# Patient Record
Sex: MALE | URBAN - METROPOLITAN AREA
[De-identification: names, ages, dates, MRNs, and addresses within clinical notes are randomized per-mention and may not be internally consistent; named-entity substitution may affect disease eponyms.]

---

## 2022-10-18 ENCOUNTER — WEB ENCOUNTER (OUTPATIENT)
Dept: URBAN - METROPOLITAN AREA CLINIC 23 | Facility: CLINIC | Age: 57
End: 2022-10-18

## 2022-10-20 ENCOUNTER — WEB ENCOUNTER (OUTPATIENT)
Dept: URBAN - METROPOLITAN AREA CLINIC 23 | Facility: CLINIC | Age: 57
End: 2022-10-20

## 2022-10-24 ENCOUNTER — LAB OUTSIDE AN ENCOUNTER (OUTPATIENT)
Dept: URBAN - METROPOLITAN AREA CLINIC 23 | Facility: CLINIC | Age: 57
End: 2022-10-24

## 2022-10-24 ENCOUNTER — OFFICE VISIT (OUTPATIENT)
Dept: URBAN - METROPOLITAN AREA CLINIC 23 | Facility: CLINIC | Age: 57
End: 2022-10-24
Payer: COMMERCIAL

## 2022-10-24 VITALS
SYSTOLIC BLOOD PRESSURE: 130 MMHG | BODY MASS INDEX: 24.91 KG/M2 | HEIGHT: 66 IN | WEIGHT: 155 LBS | DIASTOLIC BLOOD PRESSURE: 86 MMHG | HEART RATE: 84 BPM | TEMPERATURE: 97 F

## 2022-10-24 DIAGNOSIS — R19.5 LOOSE STOOLS: ICD-10-CM

## 2022-10-24 DIAGNOSIS — Z86.010 HISTORY OF COLONIC POLYPS: ICD-10-CM

## 2022-10-24 DIAGNOSIS — K30 INDIGESTION: ICD-10-CM

## 2022-10-24 DIAGNOSIS — R68.81 EARLY SATIETY: ICD-10-CM

## 2022-10-24 PROBLEM — 428283002: Status: ACTIVE | Noted: 2022-10-24

## 2022-10-24 PROCEDURE — 1036F TOBACCO NON-USER: CPT | Performed by: INTERNAL MEDICINE

## 2022-10-24 PROCEDURE — G8420 CALC BMI NORM PARAMETERS: HCPCS | Performed by: INTERNAL MEDICINE

## 2022-10-24 PROCEDURE — G8427 DOCREV CUR MEDS BY ELIG CLIN: HCPCS | Performed by: INTERNAL MEDICINE

## 2022-10-24 PROCEDURE — G9622 NO UNHEAL ETOH USER: HCPCS | Performed by: INTERNAL MEDICINE

## 2022-10-24 PROCEDURE — 3017F COLORECTAL CA SCREEN DOC REV: CPT | Performed by: INTERNAL MEDICINE

## 2022-10-24 PROCEDURE — 99204 OFFICE O/P NEW MOD 45 MIN: CPT | Performed by: INTERNAL MEDICINE

## 2022-10-24 RX ORDER — CHOLECALCIFEROL (VITAMIN D3) 50 MCG
1 TABLET TABLET ORAL ONCE A DAY
Status: ACTIVE | COMMUNITY

## 2022-10-24 RX ORDER — LISINOPRIL 20 MG/1
1 TABLET TABLET ORAL ONCE A DAY
Status: ACTIVE | COMMUNITY

## 2022-10-24 RX ORDER — ROSUVASTATIN CALCIUM 40 MG/1
1 TABLET TABLET, FILM COATED ORAL ONCE A DAY
Status: ACTIVE | COMMUNITY

## 2022-10-24 NOTE — PREVIOUS WORKUP REVIEWED
.ENDOSCOPIES-Colonoscopy 3/4/2019: Long colon.  Excellent prep.  Diminutive polyps in the rectum.  Otherwise normal colonoscopy.-EGD 12/15/2018: Erosive esophagitis.  Decreased LES.  Erythema in the antrum.  Otherwise normal EGD.*Pathology 12/15/2018: Gastric antrum-mild chronic inactive gastritis, negative for H. pylori and intestinal metaplasia.-EGD 12/23/2015: Irregular Z-line, decreased LES.  Diffuse erythema in the antrum.  Otherwise normal EGD.*Pathology 12/23/2015: Gastric antrum-minimal chronic inflammation, negative for intestinal metaplasia and H. pylori.-Colonoscopy 12/16/2015: Diverticulosis in the cecum.  1 cm polyp in the sigmoid colon.  Small polyps in the rectum.*Pathology 12/16/2015: Sigmoid colon polyp-tubular adenoma.  Rectal polyp-hyperplastic polyp.*Pathology 7/20/2012: Ileocecal valve polyp-tubular adenoma.LABSIMAGES

## 2022-10-24 NOTE — HPI-TODAY'S VISIT:
57-year-old [place] male presents for chronic indigestion and early satiety.  He was recommended by PCP to get checked with GI work-up, EGD. He already had H. pylori breath test, came back normal.  He also had a Cologuard test result is pending. He moves bowel daily, 2-3 bowel moods per day.  Postprandial, loose stools.  Is a scheduled for kidney stone removal surgery November 8.  He wants to get endoscopy exam before that date.  Previous EGD in 2018, esophagitis was noted.  Currently not on any medication. Colonoscopy in 2019.  Polyps. History of significant alcohol use (binge drinking, currently drinks 1 drink of hard liquor every day). Denies a history of pancreatitis.

## 2022-10-25 PROBLEM — 162031009: Status: ACTIVE | Noted: 2022-10-24

## 2022-11-12 ENCOUNTER — WEB ENCOUNTER (OUTPATIENT)
Dept: URBAN - METROPOLITAN AREA CLINIC 23 | Facility: CLINIC | Age: 57
End: 2022-11-12

## 2022-12-16 ENCOUNTER — OFFICE VISIT (OUTPATIENT)
Dept: URBAN - METROPOLITAN AREA MEDICAL CENTER 27 | Facility: MEDICAL CENTER | Age: 57
End: 2022-12-16
Payer: COMMERCIAL

## 2022-12-16 DIAGNOSIS — K21.00 GASTROESOPHAGEAL REFLUX DISEASE WITH ESOPHAGITIS, UNSPECIFIED WHETHER HEMORRHAGE: ICD-10-CM

## 2022-12-16 DIAGNOSIS — K22.89 DILATATION OF ESOPHAGUS: ICD-10-CM

## 2022-12-16 DIAGNOSIS — K29.60 ADENOPAPILLOMATOSIS GASTRICA: ICD-10-CM

## 2022-12-16 PROBLEM — 266433003: Status: ACTIVE | Noted: 2022-12-16

## 2022-12-16 PROCEDURE — 43239 EGD BIOPSY SINGLE/MULTIPLE: CPT | Performed by: INTERNAL MEDICINE

## 2022-12-16 RX ORDER — LISINOPRIL 20 MG/1
1 TABLET TABLET ORAL ONCE A DAY
Status: ACTIVE | COMMUNITY

## 2022-12-16 RX ORDER — ROSUVASTATIN CALCIUM 40 MG/1
1 TABLET TABLET, FILM COATED ORAL ONCE A DAY
Status: ACTIVE | COMMUNITY

## 2022-12-16 RX ORDER — ESOMEPRAZOLE MAGNESIUM 40 MG/1
1 CAPSULE CAPSULE, DELAYED RELEASE ORAL ONCE A DAY
Qty: 90 | Refills: 3 | OUTPATIENT
Start: 2022-12-16

## 2022-12-16 RX ORDER — CHOLECALCIFEROL (VITAMIN D3) 50 MCG
1 TABLET TABLET ORAL ONCE A DAY
Status: ACTIVE | COMMUNITY

## 2023-09-07 ENCOUNTER — WEB ENCOUNTER (OUTPATIENT)
Dept: URBAN - METROPOLITAN AREA CLINIC 23 | Facility: CLINIC | Age: 58
End: 2023-09-07

## 2023-09-08 ENCOUNTER — TELEPHONE ENCOUNTER (OUTPATIENT)
Dept: URBAN - METROPOLITAN AREA CLINIC 23 | Facility: CLINIC | Age: 58
End: 2023-09-08

## 2023-09-08 ENCOUNTER — CLAIMS CREATED FROM THE CLAIM WINDOW (OUTPATIENT)
Dept: URBAN - METROPOLITAN AREA CLINIC 23 | Facility: CLINIC | Age: 58
End: 2023-09-08

## 2023-09-08 ENCOUNTER — LAB OUTSIDE AN ENCOUNTER (OUTPATIENT)
Dept: URBAN - METROPOLITAN AREA CLINIC 23 | Facility: CLINIC | Age: 58
End: 2023-09-08

## 2023-09-08 ENCOUNTER — OFFICE VISIT (OUTPATIENT)
Dept: URBAN - METROPOLITAN AREA CLINIC 23 | Facility: CLINIC | Age: 58
End: 2023-09-08

## 2023-09-08 VITALS
HEART RATE: 92 BPM | BODY MASS INDEX: 23.95 KG/M2 | TEMPERATURE: 97.9 F | HEIGHT: 66 IN | WEIGHT: 149 LBS | DIASTOLIC BLOOD PRESSURE: 72 MMHG | SYSTOLIC BLOOD PRESSURE: 108 MMHG

## 2023-09-08 DIAGNOSIS — K21.9 CHRONIC GERD: ICD-10-CM

## 2023-09-08 DIAGNOSIS — Z86.010 HISTORY OF ADENOMATOUS POLYP OF COLON: ICD-10-CM

## 2023-09-08 PROBLEM — 429047008: Status: ACTIVE | Noted: 2023-09-08

## 2023-09-08 PROBLEM — 235595009: Status: ACTIVE | Noted: 2023-09-08

## 2023-09-08 RX ORDER — LISINOPRIL 20 MG/1
1 TABLET TABLET ORAL ONCE A DAY
Status: ACTIVE | COMMUNITY

## 2023-09-08 RX ORDER — ROSUVASTATIN CALCIUM 40 MG/1
1 TABLET TABLET, FILM COATED ORAL ONCE A DAY
Status: ACTIVE | COMMUNITY

## 2023-09-08 RX ORDER — SODIUM, POTASSIUM,MAG SULFATES 17.5-3.13G
AS DIRECTED SOLUTION, RECONSTITUTED, ORAL ORAL AS DIRECTED
Qty: 354 MILLILITER | Refills: 0 | OUTPATIENT
Start: 2023-09-08

## 2023-09-08 RX ORDER — PANTOPRAZOLE 40 MG/1
1 TABLET TABLET, DELAYED RELEASE ORAL ONCE A DAY
Qty: 90 TABLET | Refills: 1 | OUTPATIENT
Start: 2023-09-08

## 2023-09-08 NOTE — PREVIOUS WORKUP REVIEWED
REVIEWED EXTERNAL MEDICAL RECORD  ENDOSCOPIES -EGD 12/16/2022:Glycogenic enthesis of the esophagus.  LA grade C reflux esophagitis.  Gastritis.  Normal duodenum.*Pathology:Gastric antrum-chronic inactive gastritis, negative for H. pylori and intestinal metaplasia.  Gastric body-chronic gastritis, mild.  Negative for H. pylori and intestinal metaplasia.-Colonoscopy 3/4/2019: Long colon.  Excellent prep.  Diminutive polyps in the rectum.  Otherwise normal colonoscopy.-EGD 12/15/2018: Erosive esophagitis.  Decreased LES.  Erythema in the antrum.  Otherwise normal EGD.*Pathology 12/15/2018: Gastric antrum-mild chronic inactive gastritis, negative for H. pylori and intestinal metaplasia.-EGD 12/23/2015: Irregular Z-line, decreased LES.  Diffuse erythema in the antrum.  Otherwise normal EGD.*Pathology 12/23/2015: Gastric antrum-minimal chronic inflammation, negative for intestinal metaplasia and H. pylori.-Colonoscopy 12/16/2015: Diverticulosis in the cecum.  1 cm polyp in the sigmoid colon.  Small polyps in the rectum.*Pathology 12/16/2015: Sigmoid colon polyp-tubular adenoma.  Rectal polyp-hyperplastic polyp.*Pathology 7/20/2012: Ileocecal valve polyp-tubular adenoma.  LABS IMAGES

## 2023-09-08 NOTE — HPI-TODAY'S VISIT:
57-year-old male presents for history of adenomatous colon polyps and chronic GERD.  His last colonoscopy 4 years ago. No family history of colon cancer.  Denies blood in stool or change in bowel habit.  He moves bowel once daily in the morning. He reports heartburn and indigestion.  He stopped taking PPI.  He has a history of LA grade C esophagitis.

## 2023-09-13 ENCOUNTER — TELEPHONE ENCOUNTER (OUTPATIENT)
Dept: URBAN - METROPOLITAN AREA CLINIC 92 | Facility: CLINIC | Age: 58
End: 2023-09-13

## 2023-10-18 ENCOUNTER — WEB ENCOUNTER (OUTPATIENT)
Dept: URBAN - METROPOLITAN AREA CLINIC 23 | Facility: CLINIC | Age: 58
End: 2023-10-18

## 2023-10-20 ENCOUNTER — OFFICE VISIT (OUTPATIENT)
Dept: URBAN - METROPOLITAN AREA MEDICAL CENTER 27 | Facility: MEDICAL CENTER | Age: 58
End: 2023-10-20
Payer: COMMERCIAL

## 2023-10-20 DIAGNOSIS — Z09 CARDIOLOGY FOLLOW-UP ENCOUNTER: ICD-10-CM

## 2023-10-20 DIAGNOSIS — Z86.010 ADENOMAS PERSONAL HISTORY OF COLONIC POLYPS: ICD-10-CM

## 2023-10-20 PROCEDURE — G0105 COLORECTAL SCRN; HI RISK IND: HCPCS | Performed by: INTERNAL MEDICINE

## 2023-10-20 RX ORDER — SODIUM, POTASSIUM,MAG SULFATES 17.5-3.13G
AS DIRECTED SOLUTION, RECONSTITUTED, ORAL ORAL AS DIRECTED
Qty: 354 MILLILITER | Refills: 0 | Status: ACTIVE | COMMUNITY
Start: 2023-09-08

## 2023-10-20 RX ORDER — LISINOPRIL 20 MG/1
1 TABLET TABLET ORAL ONCE A DAY
Status: ACTIVE | COMMUNITY

## 2023-10-20 RX ORDER — ROSUVASTATIN CALCIUM 40 MG/1
1 TABLET TABLET, FILM COATED ORAL ONCE A DAY
Status: ACTIVE | COMMUNITY

## 2023-10-20 RX ORDER — PANTOPRAZOLE 40 MG/1
1 TABLET TABLET, DELAYED RELEASE ORAL ONCE A DAY
Qty: 90 TABLET | Refills: 1 | Status: ACTIVE | COMMUNITY
Start: 2023-09-08

## 2024-02-05 ENCOUNTER — OV EP (OUTPATIENT)
Dept: URBAN - METROPOLITAN AREA CLINIC 23 | Facility: CLINIC | Age: 59
End: 2024-02-05
Payer: COMMERCIAL

## 2024-02-05 VITALS
SYSTOLIC BLOOD PRESSURE: 111 MMHG | TEMPERATURE: 98.1 F | WEIGHT: 150.8 LBS | HEIGHT: 66 IN | BODY MASS INDEX: 24.23 KG/M2 | DIASTOLIC BLOOD PRESSURE: 73 MMHG | HEART RATE: 86 BPM

## 2024-02-05 DIAGNOSIS — R10.13 POSTPRANDIAL EPIGASTRIC PAIN: ICD-10-CM

## 2024-02-05 DIAGNOSIS — I77.1 CELIAC ARTERY STENOSIS: ICD-10-CM

## 2024-02-05 DIAGNOSIS — D13.5 ADENOMYOMA, GALLBLADDER: ICD-10-CM

## 2024-02-05 PROBLEM — 16846341000119101: Status: ACTIVE | Noted: 2024-02-05

## 2024-02-05 PROCEDURE — 99214 OFFICE O/P EST MOD 30 MIN: CPT | Performed by: INTERNAL MEDICINE

## 2024-02-05 RX ORDER — LISINOPRIL 20 MG/1
1 TABLET TABLET ORAL ONCE A DAY
Status: ACTIVE | COMMUNITY

## 2024-02-05 RX ORDER — PANTOPRAZOLE 40 MG/1
1 TABLET TABLET, DELAYED RELEASE ORAL ONCE A DAY
Qty: 90 TABLET | Refills: 1 | Status: ACTIVE | COMMUNITY
Start: 2023-09-08

## 2024-02-05 NOTE — PHYSICAL EXAM CONSTITUTIONAL:
- , - , - , well developed, well nourished , in no acute distress , ambulating without difficulty , normal communication ability

## 2024-02-05 NOTE — HPI-TODAY'S VISIT:
58-year-old male presents for abnormal MRI findings notated during nephrology workup.  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 He has frequent postprandial discomfort in the epigastric area. He saw a vascular surgeon, and was told to monitor and follow-up in 6 months.  He wants a second opinion. Denies family history of bladder cancer.

## 2024-02-05 NOTE — PREVIOUS WORKUP REVIEWED
REVIEWED EXTERNAL MEDICAL RECORD  ENDOSCOPIES -Colonoscopy 10/20/2023: Normal TI.  Normal colon.  Repeat colonoscopy in 5 years. -EGD 12/16/2022:Glycogenic enthesis of the esophagus.  LA grade C reflux esophagitis.  Gastritis.  Normal duodenum. *Pathology:Gastric antrum-chronic inactive gastritis, negative for H. pylori and intestinal metaplasia.  Gastric body-chronic gastritis, mild.  Negative for H. pylori and intestinal metaplasia.  Repeat EGD in 3 years. -Colonoscopy 3/4/2019: Long colon.  Excellent prep.  Diminutive polyps in the rectum.  Otherwise normal colonoscopy. -EGD 12/15/2018: Erosive esophagitis.  Decreased LES.  Erythema in the antrum.  Otherwise normal EGD. *Pathology 12/15/2018: Gastric antrum-mild chronic inactive gastritis, negative for H. pylori and intestinal metaplasia. -EGD 12/23/2015: Irregular Z-line, decreased LES.  Diffuse erythema in the antrum.  Otherwise normal EGD. *Pathology 12/23/2015: Gastric antrum-minimal chronic inflammation, negative for intestinal metaplasia and H. pylori. -Colonoscopy 12/16/2015: Diverticulosis in the cecum.  1 cm polyp in the sigmoid colon.  Small polyps in the rectum. *Pathology 12/16/2015: Sigmoid colon polyp-tubular adenoma.  Rectal polyp-hyperplastic polyp. *Pathology 7/20/2012: Ileocecal valve polyp-tubular adenoma.  LABS -Labs 1/15/2024: BUN 12, creatinine 0.93, total protein 7.0, albumin 4.5, total bilirubin 0.7, alkaline phosphatase 79, AST 20, ALT 21.  IMAGES

## 2024-11-07 ENCOUNTER — TELEPHONE ENCOUNTER (OUTPATIENT)
Dept: URBAN - METROPOLITAN AREA CLINIC 23 | Facility: CLINIC | Age: 59
End: 2024-11-07